# Patient Record
Sex: FEMALE
[De-identification: names, ages, dates, MRNs, and addresses within clinical notes are randomized per-mention and may not be internally consistent; named-entity substitution may affect disease eponyms.]

---

## 2018-12-20 ENCOUNTER — HOSPITAL ENCOUNTER (INPATIENT)
Dept: HOSPITAL 92 - L&D | Age: 23
LOS: 2 days | Discharge: HOME | End: 2018-12-22
Attending: OBSTETRICS & GYNECOLOGY | Admitting: OBSTETRICS & GYNECOLOGY
Payer: SELF-PAY

## 2018-12-20 VITALS — BODY MASS INDEX: 29.2 KG/M2

## 2018-12-20 DIAGNOSIS — Z3A.38: ICD-10-CM

## 2018-12-20 LAB
HBSAG INDEX: 0.24 S/CO (ref 0–0.99)
HGB BLD-MCNC: 12.5 G/DL (ref 12–16)
MCH RBC QN AUTO: 30.3 PG (ref 27–31)
MCV RBC AUTO: 88.5 FL (ref 78–98)
PLATELET # BLD AUTO: 190 THOU/UL (ref 130–400)
RBC # BLD AUTO: 4.12 MILL/UL (ref 4.2–5.4)
SYPHILIS ANTIBODY INDEX: 0.04 S/CO
WBC # BLD AUTO: 11.4 THOU/UL (ref 4.8–10.8)

## 2018-12-20 PROCEDURE — 86901 BLOOD TYPING SEROLOGIC RH(D): CPT

## 2018-12-20 PROCEDURE — 86850 RBC ANTIBODY SCREEN: CPT

## 2018-12-20 PROCEDURE — 87340 HEPATITIS B SURFACE AG IA: CPT

## 2018-12-20 PROCEDURE — 86780 TREPONEMA PALLIDUM: CPT

## 2018-12-20 PROCEDURE — 85027 COMPLETE CBC AUTOMATED: CPT

## 2018-12-20 PROCEDURE — 10907ZC DRAINAGE OF AMNIOTIC FLUID, THERAPEUTIC FROM PRODUCTS OF CONCEPTION, VIA NATURAL OR ARTIFICIAL OPENING: ICD-10-PCS | Performed by: OBSTETRICS & GYNECOLOGY

## 2018-12-20 PROCEDURE — 36415 COLL VENOUS BLD VENIPUNCTURE: CPT

## 2018-12-20 PROCEDURE — 86900 BLOOD TYPING SEROLOGIC ABO: CPT

## 2018-12-20 PROCEDURE — 90715 TDAP VACCINE 7 YRS/> IM: CPT

## 2018-12-21 LAB
HGB BLD-MCNC: 12.2 G/DL (ref 12–16)
MCH RBC QN AUTO: 31.1 PG (ref 27–31)
MCV RBC AUTO: 89.5 FL (ref 78–98)
PLATELET # BLD AUTO: 158 THOU/UL (ref 130–400)
RBC # BLD AUTO: 3.92 MILL/UL (ref 4.2–5.4)
WBC # BLD AUTO: 12 THOU/UL (ref 4.8–10.8)

## 2018-12-21 RX ADMIN — DOCUSATE CALCIUM SCH MG: 240 CAPSULE, LIQUID FILLED ORAL at 22:11

## 2018-12-21 RX ADMIN — DOCUSATE CALCIUM SCH MG: 240 CAPSULE, LIQUID FILLED ORAL at 09:03

## 2018-12-21 NOTE — PDOC.PP
Post Partum Progress Note


Post Partum Day #: 1


PO intake tolerated: yes


Flatus: yes


Ambulation: yes


 Vital Signs (12 hours)











  Temp Pulse Resp BP Pulse Ox


 


 12/21/18 16:00  98.1 F  64  16  109/56 L  98


 


 12/21/18 11:46  98.6 F  80  20  122/63 


 


 12/21/18 08:03  98.2 F  79  20  115/60  97


 


 12/21/18 05:02  98.3 F  68  16  105/57 L  98








 Weight











Weight                         181 lb

















- Physical Examination


General: NAD


Cardiovascular: no m/r/g, RRR


Respiratory: clear to auscultation bilaterally


Abdominal: + bowel sounds, lochia


Extremities: negative homans (B)


Neurological: no gross focal deficits


Psychiatric: A&Ox3, normal affect


Result Diagrams: 


 12/21/18 06:25





Additional Labs: 


 Post Partum Labs











Blood Type  O POSITIVE   12/20/18  17:15    


 


Hep Bs Antigen  Non-Reactive S/CO (NonReactive)   12/20/18  17:15

## 2018-12-22 VITALS — TEMPERATURE: 98.1 F | DIASTOLIC BLOOD PRESSURE: 59 MMHG | SYSTOLIC BLOOD PRESSURE: 103 MMHG

## 2018-12-22 RX ADMIN — DOCUSATE CALCIUM SCH MG: 240 CAPSULE, LIQUID FILLED ORAL at 08:16

## 2018-12-23 NOTE — DN
DATE OF PROCEDURE:  2018



PREOPERATIVE DIAGNOSIS:  Intrauterine pregnancy at 38 weeks and 1 day with

spontaneous labor. 



POSTOPERATIVE DIAGNOSIS:  Intrauterine pregnancy at 38 weeks and 1 day with

spontaneous labor. 



PROCEDURE PERFORMED:  Rapid spontaneous vaginal delivery over intact perineum.



FINDINGS:  Viable male infant weighing 3090 g or 6 pounds 13 ounces.  Apgars of 9

and 9. 



QUANTITATIVE BLOOD LOSS:  100 mL.



COMPLICATIONS:  None.



PROCEDURE IN DETAIL:  The patient presented to [facility]

where she was admitted to the labor and delivery service.  The patient underwent a

normal and uneventful labor with normal cervical dilatation until she was found to

be completely dilated.  She was then allowed to push and was able to bring the baby

down and delivered the baby in a vertex presentation without difficulties.  Once the

head delivered in occiput anterior position, the shoulders followed spontaneously

along with the rest of the baby's body.  Once out the baby's mouth and nose were

bulb suctioned.  The cord was clamped and cut and baby was handed to waiting

attendants.  Cord blood was collected.  Gentle fundal massage was performed and the

placenta delivered intact without problems.  Hemostasis was assured.  Quantitative

blood loss was calculated.  Inspection of the cervix, vaginal vault, and perineum

did not reveal any lacerations needing suturing.  Once again, hemostasis was within

normal limits and the patient was allowed to recover in the labor and delivery room.

 Baby went to  nursery. 







Job ID:  [id number]